# Patient Record
Sex: FEMALE | Race: WHITE
[De-identification: names, ages, dates, MRNs, and addresses within clinical notes are randomized per-mention and may not be internally consistent; named-entity substitution may affect disease eponyms.]

---

## 2020-01-18 ENCOUNTER — HOSPITAL ENCOUNTER (INPATIENT)
Dept: HOSPITAL 83 - ED | Age: 80
LOS: 2 days | Discharge: HOME | DRG: 871 | End: 2020-01-20
Attending: INTERNAL MEDICINE | Admitting: INTERNAL MEDICINE
Payer: COMMERCIAL

## 2020-01-18 VITALS — SYSTOLIC BLOOD PRESSURE: 130 MMHG | DIASTOLIC BLOOD PRESSURE: 60 MMHG

## 2020-01-18 VITALS — WEIGHT: 99.5 LBS | HEIGHT: 61 IN | BODY MASS INDEX: 18.78 KG/M2

## 2020-01-18 VITALS — DIASTOLIC BLOOD PRESSURE: 71 MMHG | SYSTOLIC BLOOD PRESSURE: 145 MMHG

## 2020-01-18 VITALS — DIASTOLIC BLOOD PRESSURE: 77 MMHG

## 2020-01-18 VITALS — DIASTOLIC BLOOD PRESSURE: 78 MMHG

## 2020-01-18 VITALS — DIASTOLIC BLOOD PRESSURE: 65 MMHG

## 2020-01-18 DIAGNOSIS — Z91.041: ICD-10-CM

## 2020-01-18 DIAGNOSIS — J18.9: ICD-10-CM

## 2020-01-18 DIAGNOSIS — A41.9: Primary | ICD-10-CM

## 2020-01-18 DIAGNOSIS — R65.20: ICD-10-CM

## 2020-01-18 DIAGNOSIS — J96.21: ICD-10-CM

## 2020-01-18 DIAGNOSIS — J45.909: ICD-10-CM

## 2020-01-18 DIAGNOSIS — N17.0: ICD-10-CM

## 2020-01-18 DIAGNOSIS — I25.10: ICD-10-CM

## 2020-01-18 DIAGNOSIS — Z79.82: ICD-10-CM

## 2020-01-18 DIAGNOSIS — Z79.899: ICD-10-CM

## 2020-01-18 LAB
ALBUMIN SERPL-MCNC: 2.8 GM/DL (ref 3.1–4.5)
ALP SERPL-CCNC: 92 U/L (ref 45–117)
ALT SERPL W P-5'-P-CCNC: 16 U/L (ref 12–78)
APTT PPP: 29.6 SECONDS (ref 20–32.1)
AST SERPL-CCNC: 11 IU/L (ref 3–35)
BUN SERPL-MCNC: 42 MG/DL (ref 7–24)
CHLORIDE SERPL-SCNC: 105 MMOL/L (ref 98–107)
CREAT SERPL-MCNC: 1.19 MG/DL (ref 0.55–1.02)
ERYTHROCYTE [DISTWIDTH] IN BLOOD BY AUTOMATED COUNT: 13.2 % (ref 0–14.5)
HCT VFR BLD AUTO: 40.7 % (ref 37–47)
HGB BLD-MCNC: 13 G/DL (ref 12–16)
INR BLD: 1.1 (ref 2–3.5)
LIPASE SERPL-CCNC: 121 U/L (ref 73–393)
MCH RBC QN AUTO: 29.8 PG (ref 27–31)
MCHC RBC AUTO-ENTMCNC: 31.9 G/DL (ref 33–37)
MCV RBC AUTO: 93.3 FL (ref 81–99)
NRBC BLD QL AUTO: 0 10*3/UL (ref 0–0)
PLATELET # BLD AUTO: 251 10*3/UL (ref 130–400)
PLATELET SUFFICIENCY: NORMAL
PMV BLD AUTO: 9.7 FL (ref 9.6–12.3)
POTASSIUM SERPL-SCNC: 3.7 MMOL/L (ref 3.5–5.1)
PROT SERPL-MCNC: 7.7 GM/DL (ref 6.4–8.2)
RBC # BLD AUTO: 4.36 10*6/UL (ref 4.1–5.1)
RBC MORPH BLD: NORMAL
SODIUM SERPL-SCNC: 143 MMOL/L (ref 136–145)
TOTAL CELLS COUNTED: 100 #CELLS
TROPONIN I SERPL-MCNC: < 0.015 NG/ML (ref ?–0.04)
WBC NRBC COR # BLD AUTO: 13.3 10*3/UL (ref 4.8–10.8)

## 2020-01-18 NOTE — NUR
PT NOW ON ROOM AIR TO SEE EFFECT ON PT PULSE OX PT O2 SAT LEVELS DOC IN PT
NOTES ON DIFFERENT O2 LEVELS PT WAS ADVISED TO USE CALL LIGHT IF AT ANY TIME PT
BECOMES SHORT OF BREATH OR ANY OTHER SYMPTOMS ARISE

## 2020-01-18 NOTE — NUR
A 79, admitted to , under the
services of YOANDY Beavers DO with a diagnosis of RESPIRATORY FAILURE.
Chief complaint is SOB.
Patient arrived via stretcher from ER.
Initial assessment completed.
Vital signs taken and recorded.
YOANDY BEAVERS DO notified of admission to the unit.
Orders received.
See assessment for past medical history, medications
and allergies.
Patient and/or family oriented to unit. [facility] reviewed.
Clothing/patient valuable form completed.
 
NATHAN THORNTON

## 2020-01-19 VITALS — DIASTOLIC BLOOD PRESSURE: 61 MMHG

## 2020-01-19 VITALS — SYSTOLIC BLOOD PRESSURE: 137 MMHG | DIASTOLIC BLOOD PRESSURE: 68 MMHG

## 2020-01-19 VITALS — SYSTOLIC BLOOD PRESSURE: 134 MMHG | DIASTOLIC BLOOD PRESSURE: 55 MMHG

## 2020-01-19 VITALS — SYSTOLIC BLOOD PRESSURE: 142 MMHG | DIASTOLIC BLOOD PRESSURE: 32 MMHG

## 2020-01-19 VITALS — DIASTOLIC BLOOD PRESSURE: 66 MMHG

## 2020-01-19 LAB
25(OH)D3 SERPL-MCNC: 49.4 NG/ML (ref 30–100)
APPEARANCE UR: CLEAR
BACTERIA #/AREA URNS HPF: (no result) /[HPF]
BILIRUB UR QL STRIP: NEGATIVE
BUN SERPL-MCNC: 41 MG/DL (ref 7–24)
CASTS URNS QL MICRO: (no result)
CASTS URNS QL MICRO: (no result)
CHLORIDE SERPL-SCNC: 110 MMOL/L (ref 98–107)
CHOLEST SERPL-MCNC: 105 MG/DL (ref ?–200)
COLOR UR: YELLOW
CREAT SERPL-MCNC: 0.98 MG/DL (ref 0.55–1.02)
CRYSTALS URNS MICRO: (no result)
EPI CELLS #/AREA URNS HPF: (no result) /[HPF]
ERYTHROCYTE [DISTWIDTH] IN BLOOD BY AUTOMATED COUNT: 13.4 % (ref 0–14.5)
GLUCOSE UR QL: NEGATIVE
HCT VFR BLD AUTO: 36.5 % (ref 37–47)
HDLC SERPL-MCNC: 30 MG/DL (ref 40–60)
HGB BLD-MCNC: 11.6 G/DL (ref 12–16)
HGB UR QL STRIP: (no result)
KETONES UR QL STRIP: NEGATIVE
LDLC SERPL DIRECT ASSAY-MCNC: 59 MG/DL (ref 9–159)
LEUKOCYTE ESTERASE UR QL STRIP: NEGATIVE
MCH RBC QN AUTO: 30.3 PG (ref 27–31)
MCHC RBC AUTO-ENTMCNC: 31.8 G/DL (ref 33–37)
MCV RBC AUTO: 95.3 FL (ref 81–99)
NITRITE UR QL STRIP: NEGATIVE
NRBC BLD QL AUTO: 0 10*3/UL (ref 0–0)
PH UR STRIP: 6 [PH] (ref 5–9)
PHOSPHATE SERPL-MCNC: 3.5 MG/DL (ref 2.5–4.9)
PLATELET # BLD AUTO: 224 10*3/UL (ref 130–400)
PLATELET SUFFICIENCY: NORMAL
PMV BLD AUTO: 10.3 FL (ref 9.6–12.3)
POTASSIUM SERPL-SCNC: 3.6 MMOL/L (ref 3.5–5.1)
RBC # BLD AUTO: 3.83 10*6/UL (ref 4.1–5.1)
RBC #/AREA URNS HPF: (no result) RBC/HPF (ref 0–2)
RBC MORPH BLD: NORMAL
SODIUM SERPL-SCNC: 144 MMOL/L (ref 136–145)
SP GR UR: >= 1.03 (ref 1–1.03)
T4 FREE SERPL-MCNC: 1.32 NG/DL (ref 0.76–1.46)
TOTAL CELLS COUNTED: 100 #CELLS
TOXIC GRANULES BLD QL SMEAR: SLIGHT
TRIGL SERPL-MCNC: 80 MG/DL (ref ?–150)
TSH SERPL DL<=0.005 MIU/L-ACNC: 0.35 UIU/ML (ref 0.36–4.75)
UROBILINOGEN UR STRIP-MCNC: 0.2 E.U./DL (ref 0.2–1)
VITAMIN B12: > 2000 PG/ML (ref 247–911)
VLDLC SERPL CALC-MCNC: 16 MG/DL (ref 6–40)
WBC #/AREA URNS HPF: (no result) WBC/HPF (ref 0–5)
WBC NRBC COR # BLD AUTO: 11.2 10*3/UL (ref 4.8–10.8)

## 2020-01-19 NOTE — NUR
Patient resides at home with her . Pt is independent with ADLs. She has
a nebulizer at home. Denies having home O2. Pt has never had the need for VNA
or SNF. Plan is to return home with no needs.

## 2020-01-19 NOTE — NUR
PATIENT ASSESSMENT COMPLETED AT THIS TIME WITHOUT INCIDENT. PATIENT DENIES ANY
CHEST PAIN/PRESSURE, SHORTNESS OF BREATH OR GENERALIZED PAIN AT THIS TIME.
PATIENT CURRENTLY ON NC 3LPM OXYGEN, EDUCATED PATIENT ON PURSED LIP BREATHING
TECHNIQUES, IMPORTANCE OF CONTINUED USE. PATIENT CONTINUES REFUSING CARDIAC
MONITOR STATING "MY HEART ISN'T WHY I'M HERE AND MY INSURANCE WON'T PAY FOR
IT". PATIENT ALSO CONTINUES REFUSING TO BE NASAL SWABBED FOR THE FLU VIRUS.
PHYSICIANS AWARE. PATIENT REQUESTING THAT SHE BE GIVEN ZYRTEC BEFORE BED AS
SHE TAKES IT AT HOME TO HELP HER SLEEP,

## 2020-01-19 NOTE — NUR
SITTING IN BED WITH NO ACUTE DISTRESS NOTED. RESPIRATIONS EASY. LUNGS
DIMINISHED, CLEAR. PULSE OX 94% 3.5L, TITRATED TO 2L. LOOSE NON-PROD COUGH.
OFFERED AND EDUCATED REGARDING TEDS, DECLINED. ALSO CONTINUES TO REFUSE
CARDIAC MONITOR D/T "MY INSURANCE WON'T COVER." CALL LIGHT WITHIN REACH. NO
VOICED COMPLAINTS

## 2020-01-19 NOTE — NUR
HOME O2 EVALUATION
 
SPO2 @ REST ON RA - 87% BP - 135/56
 
SPO2 @ REST ON 3LNC - 93%
 
SPO2 WHILE AMBULATING ON 3LNC - 87-88%
 
SPO2 WHILE AMBULATING ON 4LNC - 89%-92%
 
SPO2 @ REST ON 3LNC POST AMBULATION 93%-94% BP - 135/86

## 2020-01-19 NOTE — NUR
RESTING IN BED. O2 PRESENT AT BEDSIDE BUT NOT IN USE, PULSE OX 89% RA. O2
REAPPLIED AT 3L, PULSE OX 94%. PATIENT ENCOURAGED TO LEAVE O2 IN USE.
AGAIN EDUCATED REGARDING CARDIAC MONITOR, CONTINUES TO DECLINE STATING
"THERE'S NOTHING WRONG WITH MY HEART."

## 2020-01-19 NOTE — NUR
DR BRAXTON CONTACTED AT PATIENT'S REQUEST REGARDING HOME MEDS. PATIENT TAKES
BENZAPRIL, NOT ORDERED. DR BRAXTON TO REORDER. ALSO INFORMED PATIENT PULSE OX
91% 2L, DESATED TO 78% RA UPON AMBULATING TO BR. O2 REAPPLIED 2L, PULSE OX 90%

## 2020-01-20 VITALS — DIASTOLIC BLOOD PRESSURE: 69 MMHG | SYSTOLIC BLOOD PRESSURE: 128 MMHG

## 2020-01-20 VITALS — DIASTOLIC BLOOD PRESSURE: 68 MMHG

## 2020-01-20 LAB
BASOPHILS # BLD AUTO: 0 10*3/UL (ref 0–0.1)
BASOPHILS NFR BLD AUTO: 0.4 % (ref 0–1)
BUN SERPL-MCNC: 40 MG/DL (ref 7–24)
CHLORIDE SERPL-SCNC: 109 MMOL/L (ref 98–107)
CREAT SERPL-MCNC: 1.09 MG/DL (ref 0.55–1.02)
EOSINOPHIL # BLD AUTO: 0 % (ref 1–4)
EOSINOPHIL # BLD AUTO: 0 10*3/UL (ref 0–0.4)
ERYTHROCYTE [DISTWIDTH] IN BLOOD BY AUTOMATED COUNT: 13.3 % (ref 0–14.5)
HCT VFR BLD AUTO: 35.1 % (ref 37–47)
HGB BLD-MCNC: 10.9 G/DL (ref 12–16)
LYMPHOCYTES # BLD AUTO: 0.5 10*3/UL (ref 1.3–4.4)
LYMPHOCYTES NFR BLD AUTO: 6.5 % (ref 27–41)
MCH RBC QN AUTO: 29.5 PG (ref 27–31)
MCHC RBC AUTO-ENTMCNC: 31.1 G/DL (ref 33–37)
MCV RBC AUTO: 94.9 FL (ref 81–99)
MONOCYTES # BLD AUTO: 0.4 10*3/UL (ref 0.1–1)
MONOCYTES NFR BLD MANUAL: 4.7 % (ref 3–9)
NEUT #: 6.5 10*3/UL (ref 2.3–7.9)
NEUT %: 86.1 % (ref 47–73)
NRBC BLD QL AUTO: 0 % (ref 0–0)
PLATELET # BLD AUTO: 164 10*3/UL (ref 130–400)
PMV BLD AUTO: 10.8 FL (ref 9.6–12.3)
POTASSIUM SERPL-SCNC: 3.3 MMOL/L (ref 3.5–5.1)
RBC # BLD AUTO: 3.7 10*6/UL (ref 4.1–5.1)
SODIUM SERPL-SCNC: 144 MMOL/L (ref 136–145)
WBC NRBC COR # BLD AUTO: 7.5 10*3/UL (ref 4.8–10.8)

## 2020-01-20 NOTE — NUR
in to talk to patient.
Patient states lives at home with zheng.
There are few steps in the home.
Physician: tiffany garcia
Pharmacy: [facility]
Home health services: none
Patient's level of ADLs: INDEPENDENT
Patient has working utilities: all working
DME: none
Follow-up physician's appointment after d/c: will be made by hospitalist nurse
director upon discharge
Does patient want to access PORTAL?: no
Discharge plan discussed with patient, she states she lives at home with her
, she is independent in adls and ambulation, she states she will return
home when medically stable, patient stated she would need home oxygen when
discharged and did not want it, educated her on the purpose of oxygen and
educated her that she may not need it for very long, discussed with her the
adverse outcome that can happen if she did not wear the home oxygen, she stated
respiratory was working with her insurance company to get the home oxygen
discussed with her VNA and educated her on the services they provide and she
declined any home services, case management will follow.
 
KEVIN GERARD

## 2020-01-20 NOTE — NUR
PT DISCHARGED HOME AT THIS TIME. PORATBLE O2 TANK HOOKED UP AND FUNCTIONING.
PRESCRIPTIONS AND FOLLOW UP CARE DISCUSSED. VSS. PT TRANPORTED TO PRIVATE CAR
VIA WHEELCHAIR.